# Patient Record
Sex: FEMALE | Race: WHITE | NOT HISPANIC OR LATINO | Employment: OTHER | URBAN - METROPOLITAN AREA
[De-identification: names, ages, dates, MRNs, and addresses within clinical notes are randomized per-mention and may not be internally consistent; named-entity substitution may affect disease eponyms.]

---

## 2017-01-16 ENCOUNTER — ALLSCRIPTS OFFICE VISIT (OUTPATIENT)
Dept: OTHER | Facility: OTHER | Age: 82
End: 2017-01-16

## 2017-09-18 ENCOUNTER — APPOINTMENT (OUTPATIENT)
Dept: LAB | Facility: HOSPITAL | Age: 82
End: 2017-09-18
Payer: MEDICARE

## 2017-09-18 ENCOUNTER — ALLSCRIPTS OFFICE VISIT (OUTPATIENT)
Dept: OTHER | Facility: OTHER | Age: 82
End: 2017-09-18

## 2017-09-18 DIAGNOSIS — Z12.11 ENCOUNTER FOR SCREENING FOR MALIGNANT NEOPLASM OF COLON: ICD-10-CM

## 2017-09-18 DIAGNOSIS — E55.9 VITAMIN D DEFICIENCY: ICD-10-CM

## 2017-09-18 DIAGNOSIS — D50.9 IRON DEFICIENCY ANEMIA: ICD-10-CM

## 2017-09-18 DIAGNOSIS — E03.9 HYPOTHYROIDISM: ICD-10-CM

## 2017-09-18 DIAGNOSIS — E78.00 PURE HYPERCHOLESTEROLEMIA: ICD-10-CM

## 2017-09-18 LAB
25(OH)D3 SERPL-MCNC: 40.6 NG/ML (ref 30–100)
ANION GAP SERPL CALCULATED.3IONS-SCNC: 9 MMOL/L (ref 4–13)
BASOPHILS # BLD AUTO: 0.03 THOUSANDS/ΜL (ref 0–0.1)
BASOPHILS NFR BLD AUTO: 1 % (ref 0–1)
BUN SERPL-MCNC: 13 MG/DL (ref 5–25)
CALCIUM SERPL-MCNC: 9.3 MG/DL (ref 8.3–10.1)
CHLORIDE SERPL-SCNC: 105 MMOL/L (ref 100–108)
CHOLEST SERPL-MCNC: 253 MG/DL (ref 50–200)
CO2 SERPL-SCNC: 28 MMOL/L (ref 21–32)
CREAT SERPL-MCNC: 0.72 MG/DL (ref 0.6–1.3)
EOSINOPHIL # BLD AUTO: 0.16 THOUSAND/ΜL (ref 0–0.61)
EOSINOPHIL NFR BLD AUTO: 3 % (ref 0–6)
ERYTHROCYTE [DISTWIDTH] IN BLOOD BY AUTOMATED COUNT: 17.2 % (ref 11.6–15.1)
GFR SERPL CREATININE-BSD FRML MDRD: 78 ML/MIN/1.73SQ M
GLUCOSE P FAST SERPL-MCNC: 74 MG/DL (ref 65–99)
HCT VFR BLD AUTO: 44.1 % (ref 34.8–46.1)
HDLC SERPL-MCNC: 119 MG/DL (ref 40–60)
HGB BLD-MCNC: 13.9 G/DL (ref 11.5–15.4)
LDLC SERPL CALC-MCNC: 121 MG/DL (ref 0–100)
LYMPHOCYTES # BLD AUTO: 0.9 THOUSANDS/ΜL (ref 0.6–4.47)
LYMPHOCYTES NFR BLD AUTO: 16 % (ref 14–44)
MCH RBC QN AUTO: 29.3 PG (ref 26.8–34.3)
MCHC RBC AUTO-ENTMCNC: 31.5 G/DL (ref 31.4–37.4)
MCV RBC AUTO: 93 FL (ref 82–98)
MONOCYTES # BLD AUTO: 0.57 THOUSAND/ΜL (ref 0.17–1.22)
MONOCYTES NFR BLD AUTO: 10 % (ref 4–12)
NEUTROPHILS # BLD AUTO: 3.93 THOUSANDS/ΜL (ref 1.85–7.62)
NEUTS SEG NFR BLD AUTO: 70 % (ref 43–75)
NRBC BLD AUTO-RTO: 0 /100 WBCS
PLATELET # BLD AUTO: 286 THOUSANDS/UL (ref 149–390)
PMV BLD AUTO: 9.8 FL (ref 8.9–12.7)
POTASSIUM SERPL-SCNC: 4.1 MMOL/L (ref 3.5–5.3)
RBC # BLD AUTO: 4.74 MILLION/UL (ref 3.81–5.12)
SODIUM SERPL-SCNC: 142 MMOL/L (ref 136–145)
TRIGL SERPL-MCNC: 66 MG/DL
TSH SERPL DL<=0.05 MIU/L-ACNC: 3.56 UIU/ML (ref 0.36–3.74)
WBC # BLD AUTO: 5.61 THOUSAND/UL (ref 4.31–10.16)

## 2017-09-18 PROCEDURE — 84443 ASSAY THYROID STIM HORMONE: CPT

## 2017-09-18 PROCEDURE — 36415 COLL VENOUS BLD VENIPUNCTURE: CPT

## 2017-09-18 PROCEDURE — 80061 LIPID PANEL: CPT

## 2017-09-18 PROCEDURE — 82306 VITAMIN D 25 HYDROXY: CPT

## 2017-09-18 PROCEDURE — 85025 COMPLETE CBC W/AUTO DIFF WBC: CPT

## 2017-09-18 PROCEDURE — 80048 BASIC METABOLIC PNL TOTAL CA: CPT

## 2017-09-20 ENCOUNTER — GENERIC CONVERSION - ENCOUNTER (OUTPATIENT)
Dept: OTHER | Facility: OTHER | Age: 82
End: 2017-09-20

## 2017-11-08 ENCOUNTER — GENERIC CONVERSION - ENCOUNTER (OUTPATIENT)
Dept: OTHER | Facility: OTHER | Age: 82
End: 2017-11-08

## 2017-11-13 ENCOUNTER — ALLSCRIPTS OFFICE VISIT (OUTPATIENT)
Dept: OTHER | Facility: OTHER | Age: 82
End: 2017-11-13

## 2017-12-07 RX ORDER — ZOLEDRONIC ACID 5 MG/100ML
5 INJECTION, SOLUTION INTRAVENOUS ONCE
Status: COMPLETED | OUTPATIENT
Start: 2017-12-08 | End: 2017-12-08

## 2017-12-07 RX ORDER — SODIUM CHLORIDE 9 MG/ML
20 INJECTION, SOLUTION INTRAVENOUS CONTINUOUS
Status: DISCONTINUED | OUTPATIENT
Start: 2017-12-08 | End: 2017-12-11 | Stop reason: HOSPADM

## 2017-12-08 ENCOUNTER — HOSPITAL ENCOUNTER (OUTPATIENT)
Dept: INFUSION CENTER | Facility: CLINIC | Age: 82
Discharge: HOME | End: 2017-12-08
Payer: MEDICARE

## 2017-12-08 ENCOUNTER — ALLSCRIPTS OFFICE VISIT (OUTPATIENT)
Dept: OTHER | Facility: OTHER | Age: 82
End: 2017-12-08

## 2017-12-08 VITALS
SYSTOLIC BLOOD PRESSURE: 138 MMHG | HEART RATE: 70 BPM | DIASTOLIC BLOOD PRESSURE: 72 MMHG | TEMPERATURE: 98 F | WEIGHT: 143.3 LBS | HEIGHT: 61 IN | BODY MASS INDEX: 27.06 KG/M2 | RESPIRATION RATE: 18 BRPM

## 2017-12-08 LAB
ALBUMIN SERPL BCP-MCNC: 3.6 G/DL (ref 3.2–5)
ALP SERPL-CCNC: 58 U/L (ref 46–116)
ALT SERPL W P-5'-P-CCNC: 22 U/L (ref 12–78)
ANION GAP SERPL CALCULATED.3IONS-SCNC: 13 MMOL/L (ref 4–13)
AST SERPL W P-5'-P-CCNC: 30 U/L (ref 5–45)
BILIRUB SERPL-MCNC: 1.2 MG/DL (ref 0.2–1)
BUN SERPL-MCNC: 16 MG/DL (ref 5–25)
CALCIUM SERPL-MCNC: 9.6 MG/DL (ref 8.3–10.1)
CHLORIDE SERPL-SCNC: 104 MMOL/L (ref 98–108)
CO2 SERPL-SCNC: 28 MMOL/L (ref 21–32)
CREAT SERPL-MCNC: 1 MG/DL (ref 0.6–1.3)
GFR SERPL CREATININE-BSD FRML MDRD: 52 ML/MIN/1.73SQ M
GLUCOSE SERPL-MCNC: 97 MG/DL (ref 65–140)
POTASSIUM SERPL-SCNC: 4.4 MMOL/L (ref 3.5–5.3)
PROT SERPL-MCNC: 6.8 G/DL (ref 6.4–8.2)
SODIUM SERPL-SCNC: 145 MMOL/L (ref 136–145)

## 2017-12-08 PROCEDURE — 80053 COMPREHEN METABOLIC PANEL: CPT | Performed by: INTERNAL MEDICINE

## 2017-12-08 PROCEDURE — 96365 THER/PROPH/DIAG IV INF INIT: CPT

## 2017-12-08 RX ORDER — MULTIVIT-MIN/IRON/FOLIC ACID/K 18-600-40
4000 CAPSULE ORAL DAILY
COMMUNITY

## 2017-12-08 RX ORDER — LEVOTHYROXINE SODIUM 0.05 MG/1
50 TABLET ORAL DAILY
COMMUNITY
End: 2018-03-21

## 2017-12-08 RX ADMIN — SODIUM CHLORIDE 20 ML/HR: 0.9 INJECTION, SOLUTION INTRAVENOUS at 11:00

## 2017-12-08 RX ADMIN — ZOLEDRONIC ACID 5 MG: 0.05 INJECTION, SOLUTION INTRAVENOUS at 11:02

## 2017-12-11 NOTE — PROGRESS NOTES
Assessment    1  Iron deficiency anemia (280 9) (D50 9)   2  Osteoporosis (733 00) (M81 0)    Plan  Iron deficiency anemia, Osteoporosis    · Follow-up PRN Evaluation and Treatment  Follow-up  Status: Complete  Done:[id number]   Ordered;Iron deficiency anemia, Osteoporosis; Ordered By: Segundo Rainey Performed:  Due: [id number]   · * DXA BONE DENSITY SPINE HIP AND PELVIS; Status:Active; Requested IMB:[id number]:74CK;    Perform:[facility] Radiology; Order Comments:NO CALCIUM 24 HOURS PRIOR TO TEST; Due:12Jan2019; Last Updated By:Jose Mccullough; 12/8/2017 8:50:09 AM;Ordered;deficiency anemia, Osteoporosis; Ordered By:Sharee Alvarado;   · * MAMMO SCREENING BILATERAL W CAD; Status:Active; Requested for:12Jan201809:40AM;    Perform:[facility] Radiology; Order Comments:KOKO Keen; XJD:[id number]; Last Updated By:Jose Mccullough; 12/8/2017 8:50:09 AM;Ordered;deficiency anemia, Osteoporosis; Ordered By:Sharee Alvarado;  Osteoporosis    · (1) COMPREHENSIVE METABOLIC PANEL; Status:Active - Retrospective By ProtocolAuthorization; Requested for:[id number];    Perform:[facility]; Order Comments:STAT; PWF:[id number]; Last Updated By:Justina Vann; 12/8/2017 9:42:50 AM;Ordered; Stat;Ordered By:Sharee Alvarado; Discussion/Summary  Discussion Summary:   Jackie Peñaloza has been feeling well  Anemia has not recurred since she received iron sucrose 200 mg IV weekly x2 back in June 2015  D 1000 units daily is to be continued in regards to history of mild vitamin D deficiency and osteoporosis of the femoral neck  She does not tolerate Caltrate but is able to tolerate Tums, accordingly, she is to take Tums 500 mg daily  is to receive the 5th course of Reclast 5 mg IV today  Afterwards, a DEXA scan is to be obtained and a decision as to further Reclast accordingly  mammogram is recommended for breast cancer screening    patient is aware to seek medical attention for shortness of breath, lightheadedness, easy fatigue fatigue, or if other new problems arise  CBC is to be monitored at least yearly in conjunction with history and physical examination in regards to history of iron deficiency anemia  She plans to follow up with her primary care physician in this regard and at her preference a follow-up visit at our office was not scheduled  However, please do not hesitate to let us know if we can be of further assistance in the care of [facility]  In particular, I would like to see her again in the case of recurrent anemia or other blood count abnormalities or for decisions as to management of bone mineral loss  Chief Complaint  Chief Complaint Free Text Note Form: [facility] was seen in followup today in regards to iron deficiency anemia and osteoporosis with vitamin D deficiency  History of Present Illness  HPI: She has been feeling well  She has been taking vitamin D 1000 units daily and Tums 2-3 times per week and a multiple vitamin daily  She's been taking about a 4 mile walk 3-4 days per week  Previous Therapy:   1  Anemia  There was laboratory evidence of iron deficiency in March 2011 without known blood loss  Anemia resolved with Venofer 1000 mg from March 2012 to April 2012  Recurrent iron deficiency in April 2013  A second course of Venofer 1000 mg from April 2013 to May 2013  Venofer 400 mg in 2 sessions in June 2014  Severe osteopenia of the lumbar spine and osteoporosis of the femoral neck  The first course of Reclast 5 mg IV given on May 24, 2013, 3 courses thus far most recently on December 11, 2015  Vitamin D deficiency, vitamin D 2000 units daily resumed in October 2013  Review of Systems  Complete-Female:  Constitutional: She has been feeling well  ENT: She has been sinus stuffiness associated with chronic allergies  Cardiovascular: no chest pain-- and-- no lower extremity edema  Respiratory: no shortness of breath-- and-- no cough    Gastrointestinal: no abdominal pain,-- no constipation-- and-- no diarrhea  Musculoskeletal: no arthralgias  Neurological: She has occasional frontal headache associated with chronic allergies  , but-- no difficulty walking  Psychiatric: no emotional problems  Active Problems  1  Age-related macular degeneration (362 50) (H35 30)   2  Hiatal hernia (553 3) (K44 9)   3  Hypercholesterolemia (272 0) (E78 00)   4  Hypothyroidism (244 9) (E03 9)   5  Iron deficiency anemia (280 9) (D50 9)   6  Medicare annual wellness visit, subsequent (V70 0) (Z00 00)   7  Mild vitamin D deficiency (268 9) (E55 9)   8  Mixed hyperlipidemia (272 2) (E78 2)   9  Need for influenza vaccination (V04 81) (Z23)   10  Need for pneumococcal vaccine (V03 82) (Z23)   11  Nocturia (788 43) (R35 1)   12  Osteoporosis (733 00) (M81 0)   13  Screening for colon cancer (V76 51) (Z12 11)   14  Screening for genitourinary condition (V81 6) (Z13 89)    Past Medical History    1  History of Distal radius fracture (813 42) (S52 509A)   2  History of Encounter for screening mammogram for malignant neoplasm of breast (V76 12) (Z12 31)   3   2 para 2 (V49 89) (Z78 9)   4  History of esophageal reflux (V12 79) (Z87 19)   5  History of tinnitus (V12 49) (Z86 69)   6  History of urinary incontinence (V13 09) (Z87 898)   7  History of Menopause (627 2) (Z78 0)   8  History of Screening for osteoporosis (V82 81) (B64 225)    Surgical History  1  History of Bunion Correction By Double Osteotomy   2  History of Closed Treatment Of Fracture Of The Distal Radius   3  History of Open Treatment Of Fracture Of Distal Radius   4  History of Thyroid Surgery Total Thyroidectomy   5  History of Tonsillectomy With Adenoidectomy    Family History  Mother    1  Family history of sepsis (V18 8) (Z83 1)  Father    2  Family history of cerebrovascular accident (CVA) (V17 1) (Z82 3)  Paternal Grandmother    3  Family history of cardiovascular disease (V17 49) (Z82 49)   4   Family history of type 2 diabetes mellitus (V18 0) (Z83 3)  Grandfather    5  Family history of cerebrovascular accident (V17 1) (Z82 3)  Paternal Grandfather    10  Family history of cardiovascular disease (V17 49) (Z82 49)    Social History     · Cultural background   ·    · Former smoker (V15 82) (S81 165)   · Lives with daughter   · Occupation   · Primary spoken language English   · Racial background   · Remote social alcohol use   · Retired    Current Meds   1  Synthroid 50 MCG Oral Tablet; Therapy: (Recorded:03Jun2014) to Recorded   2  Vitamin D3 5000 UNIT Oral Tablet; Therapy: (TJZXMGBJ:[id number]) to Recorded    Allergies  1  Iron    Vitals  Vital Signs    Recorded: 29JRI3548 08:18AM   Temperature 97 2 F   Heart Rate 64   Respiration 16   Systolic 991   Diastolic 80   Height 5 ft 2 in   Weight 143 lb    BMI Calculated 26 16   BSA Calculated 1 66   O2 Saturation 97   Pain Scale 0       Physical Exam   Constitutional She appears well  Eyes EOMI  Ears, Nose, Mouth, and Throat Oropharynx is clear  Pulmonary  Auscultation of lungs: Clear to auscultation  Cardiovascular Heart has regular rate and rhythm  -- No lower extremity edema  Abdomen  Abdomen: Non-tender, no masses  Liver and spleen: No hepatomegaly or splenomegaly  Lymphatic  Palpation of lymph nodes in neck: No lymphadenopathy  -- No axillary or inguinal lymphadenopathy  Musculoskeletal  Gait and station: Normal    Neurologic Neurological is grossly intact  Psychiatric  Orientation to person, place, and time: Normal    Mood and affect: Normal    Additional Exam:  (Breast examination has been done at her primary care physician's office in September 2017 and was declined today  )         ECOG 0       Results/Data  (1) COMPREHENSIVE METABOLIC PANEL [id number] 42:73CX Arvil Bal     Test Name Result Flag Reference   GLUCOSE,RANDM 97 mg/dL       If the patient is fasting, the ADA then defines impaired fasting glucose as > 100 mg/dL and diabetes as > or equal to 123 mg/dL  Specimen collection should occur prior to Sulfasalazine administration due to the potential for falsely depressed results  Specimen collection should occur prior to Sulfapyridine administration due to the potential for falsely elevated results  SODIUM 145 mmol/L  136-145   POTASSIUM 4 4 mmol/L  3 5-5 3   CHLORIDE 104 mmol/L     CARBON DIOXIDE 28 mmol/L  21-32   ANION GAP (CALC) 13 mmol/L  4-13   BLOOD UREA NITROGEN 16 mg/dL  5-25   CREATININE 1 00 mg/dL  0 60-1 30   Standardized to IDMS reference method   CALCIUM 9 6 mg/dL  8 3-10 1   BILI, TOTAL 1 20 mg/dL H 0 20-1 00   ALK PHOSPHATAS 58 U/L     ALT (SGPT) 22 U/L  12-78   Specimen collection should occur prior to Sulfasalazine administration due to the potential for falsely depressed results  AST(SGOT) 30 U/L  5-45   Specimen collection should occur prior to Sulfasalazine administration due to the potential for falsely depressed results  ALBUMIN 3 6 g/dL  3 2-5 0   TOTAL PROTEIN 6 8 g/dL  6 4-8 2   eGFR 52 ml/min/1 73sq [facility] Disease Education Program recommendations are as follows: GFR calculation is accurate only with a steady state creatinine Chronic Kidney disease less than 60 ml/min/1 73 sq  meters Kidney failure less than 15 ml/min/1 73 sq  meters  Results Form:   Results  Lab Results From November 22, 2016: serum iron is 37 with saturation 9%  May 24, 2016: 25 hydroxy vitamin D is 71  5  September 18, 2017: WBC is 5 61, hemoglobin 13 9 with MCV 93, platelets 281, 25 hydroxy vitamin-D is 40 6, TSH 3 560  From December 8, 2017: Creatinine 1 0, bili 1 2, AST 30, ALT 22, alk-phos 58  Future Appointments    Date/Time Provider Specialty Site   05/14/2018 09:00 AM VAN Singh   [address] [place] [address]       Signatures   Electronically signed by : VAN Cruz ; Dec 10 2017  2:22PM EST                       (Author)

## 2018-01-10 DIAGNOSIS — Z12.31 ENCOUNTER FOR SCREENING MAMMOGRAM FOR MALIGNANT NEOPLASM OF BREAST: ICD-10-CM

## 2018-01-12 ENCOUNTER — HOSPITAL ENCOUNTER (OUTPATIENT)
Dept: BONE DENSITY | Facility: MEDICAL CENTER | Age: 83
Discharge: HOME | End: 2018-01-12
Payer: MEDICARE

## 2018-01-12 ENCOUNTER — GENERIC CONVERSION - ENCOUNTER (OUTPATIENT)
Dept: OTHER | Facility: OTHER | Age: 83
End: 2018-01-12

## 2018-01-12 ENCOUNTER — HOSPITAL ENCOUNTER (OUTPATIENT)
Dept: MAMMOGRAPHY | Facility: MEDICAL CENTER | Age: 83
Discharge: HOME | End: 2018-01-12
Payer: MEDICARE

## 2018-01-12 DIAGNOSIS — D50.9 IRON DEFICIENCY ANEMIA: ICD-10-CM

## 2018-01-12 DIAGNOSIS — M81.0 AGE-RELATED OSTEOPOROSIS WITHOUT CURRENT PATHOLOGICAL FRACTURE: ICD-10-CM

## 2018-01-12 DIAGNOSIS — Z12.31 SCREENING MAMMOGRAM, ENCOUNTER FOR: ICD-10-CM

## 2018-01-12 PROCEDURE — 77080 DXA BONE DENSITY AXIAL: CPT

## 2018-01-12 PROCEDURE — 77067 SCR MAMMO BI INCL CAD: CPT

## 2018-01-13 VITALS
DIASTOLIC BLOOD PRESSURE: 68 MMHG | BODY MASS INDEX: 23.66 KG/M2 | WEIGHT: 142 LBS | HEIGHT: 65 IN | SYSTOLIC BLOOD PRESSURE: 122 MMHG

## 2018-01-13 VITALS
SYSTOLIC BLOOD PRESSURE: 132 MMHG | BODY MASS INDEX: 23.32 KG/M2 | HEIGHT: 65 IN | TEMPERATURE: 97.6 F | RESPIRATION RATE: 16 BRPM | DIASTOLIC BLOOD PRESSURE: 72 MMHG | WEIGHT: 140 LBS

## 2018-01-15 NOTE — MISCELLANEOUS
Message  Got notification from [facility] the Dexa scan was not done ,patient declined to have testing performed  Active Problems    1  Age-related macular degeneration (362 50) (H35 30)   2  Hiatal hernia (553 3) (K44 9)   3  Hypercholesterolemia (272 0) (E78 00)   4  Hypothyroidism (244 9) (E03 9)   5  Iron deficiency anemia (280 9) (D50 9)   6  Mild vitamin D deficiency (268 9) (E55 9)   7  Mixed hyperlipidemia (272 2) (E78 2)   8  Need for pneumococcal vaccine (V03 82) (Z23)   9  Osteoporosis (733 00) (M81 0)   10  Screening for colon cancer (V76 51) (Z12 11)    Current Meds   1  Synthroid 50 MCG Oral Tablet (Levothyroxine Sodium); Therapy: (Recorded:03Jun2014) to Recorded   2  Vitamin D3 5000 UNIT Oral Tablet; Therapy: (DVXTCLGA:[id number]) to Recorded    Allergies    1   Iron    Signatures   Electronically signed by : Alka Flanagan, ; Nov 8 2017  8:51AM EST                       (Author)

## 2018-01-15 NOTE — RESULT NOTES
Verified Results  (1) CBC/PLT/DIFF [id number] 04:51PM Juaquin Primes     Test Name Result Flag Reference   WBC COUNT 5 61 Thousand/uL  4 31-10 16   RBC COUNT 4 74 Million/uL  3 81-5 12   HEMOGLOBIN 13 9 g/dL  11 5-15 4   HEMATOCRIT 44 1 %  34 8-46  1   MCV 93 fL  82-98   MCH 29 3 pg  26 8-34 3   MCHC 31 5 g/dL  31 4-37 4   RDW 17 2 % H 11 6-15 1   MPV 9 8 fL  8 9-12 7   PLATELET COUNT 381 Thousands/uL  149-390   nRBC AUTOMATED 0 /100 WBCs     NEUTROPHILS RELATIVE PERCENT 70 %  43-75   LYMPHOCYTES RELATIVE PERCENT 16 %  14-44   MONOCYTES RELATIVE PERCENT 10 %  4-12   EOSINOPHILS RELATIVE PERCENT 3 %  0-6   BASOPHILS RELATIVE PERCENT 1 %  0-1   NEUTROPHILS ABSOLUTE COUNT 3 93 Thousands/? ??L  1 85-7 62   LYMPHOCYTES ABSOLUTE COUNT 0 90 Thousands/? ??L  0 60-4 47   MONOCYTES ABSOLUTE COUNT 0 57 Thousand/? ??L  0 17-1 22   EOSINOPHILS ABSOLUTE COUNT 0 16 Thousand/? ??L  0 00-0 61   BASOPHILS ABSOLUTE COUNT 0 03 Thousands/? ??L  0 00-0 10

## 2018-01-16 NOTE — PROGRESS NOTES
Assessment    1  Medicare annual wellness visit, subsequent (V70 0) (Z00 00)   2  Hypercholesterolemia (272 0) (E78 00)   3  Hypothyroidism (244 9) (E03 9)   4  Nocturia (788 43) (R35 1)   5  Need for influenza vaccination (V04 81) (Z23)    Plan  Need for influenza vaccination    · Influenza; INJECT 0 5  ML Intramuscular; To Be Done: [id number]    Discussion/Summary    AWV completed today  Patient did have 1 fall this year when she walked outside and was blinded by the sun, causing her to lose balance on uneven ground  She has since seen PT and feels her balance is much improved  She lives in a ranch style home with her daughter who is a nurse and is fall-proofing the home  We also discussed nocturia resulting in some incontinence and recommended not having coffee and alcohol with dinner to prevent diuresis  Flu vaccine administered today  Follow up in 6 months  Impression: Subsequent Annual Wellness Visit, with preventive exam as well as age and risk appropriate counseling completed  Cardiovascular screening and counseling: the risks and benefits of screening were discussed, screening is current, counseling was given on maintaining a healthy diet, counseling was given on maintaining a healthy weight and counseling was given on ways to improve cholesterol  Diabetes screening and counseling: the risks and benefits of screening were discussed, screening is current and counseling was given on maintaining a healthy diet  Colorectal cancer screening and counseling: screening not indicated  Breast cancer screening and counseling: screening not indicated  Cervical cancer screening and counseling: screening not indicated  Osteoporosis screening and counseling: screening is current and counseling was given on obtaining adequate amounts of calcium and vitamin D on a daily basis  Abdominal aortic aneurysm screening and counseling: screening not indicated     Glaucoma screening and counseling: screening is current  HIV screening and counseling: screening not indicated  Immunizations: the risks and benefits of influenza vaccination were discussed with the patient, influenza vaccine is due today and the lifetime pneumococcal vaccine has been completed  Advance Directive Planning: complete and up to date  Patient Discussion: plan discussed with the patient, follow-up visit needed in 6 months  Self Referrals: No   The treatment plan was reviewed with the patient/guardian  The patient/guardian understands and agrees with the treatment plan      Chief Complaint  79 y/o female presents for AWV      History of Present Illness  HPI: 79 y/o female presents for AWV  She is doing well, keeping very active with walking, playing tennis and playing pickleball  Admits that she sometimes has problems staying asleep due to nocturia that sometimes is very urgent and results in leakage  She now sometimes wears a liner at night  She admits to drinking coffee and a glass of wine with dinner  Welcome to [employer] and Wellness Visits: The patient is being seen for the subsequent annual wellness visit  Medicare Screening and Risk Factors   Hospitalizations: she has been previously hospitalizied, she has been hospitalized 3 times and anemia, thyroidectomy  Once per lifetime medicare screening tests: ECG (NSR) and AAA screening US has not yet been done  Medicare Screening Tests Risk Questions   Abdominal aortic aneurysm risk assessment: none indicated  Osteoporosis risk assessment: , female gender, over 48years of age, alcohol use and 1 glass wine with dinner  HIV risk assessment: none indicated  Drug and Alcohol Use: The patient is a former cigarette smoker  She has smoked for 35 year(s) and has 35 pack year(s) of cigarette use  The patient reports drinking 1 drinks per day  Alcohol concern:   The patient has no concerns about alcohol abuse  She has never used illicit drugs     Diet and Physical Activity: Current diet includes well balanced meals  She exercises daily  Exercise: walking, tennis and pickleball 4 hours per week  Mood Disorder and Cognitive Impairment Screening: She denies feeling down, depressed, or hopeless over the past two weeks  She denies feeling little interest or pleasure in doing things over the past two weeks  Cognitive impairment screening: denies difficulty learning/retaining new information, denies difficulty handling complex tasks, denies difficulty with reasoning, denies difficulty with spatial ability and orientation, denies difficulty with language and denies difficulty with behavior  Functional Ability/Level of Safety: Hearing is hearing aids  She reports hearing difficulties  She uses a hearing aid  The patient is currently able to do activities of daily living without limitations  Activities of daily living details: does not need help using the phone, no transportation help needed, does not need help shopping, no meal preparation help needed, does not need help doing housework, does not need help doing laundry, does not need help managing medications and does not need help managing money  Fall risk factors: The patient fell 1 times in the past 12 months  improved after PT  Injury History: no polypharmacy, no alcohol use, no mobility impairment, no antidepressant use, no deconditioning, no postural hypotension, no sedative use, no visual impairment, no urinary incontinence, no antihypertensive use, no cognitive impairment, up and go test was normal and previous fall The most recent fall occurred 20 week(s) ago outdoors  The fall resulted from tripping  The fall was preceded by blinded by the sun  The fall resulted in abrasion(s) to knees and face and laceration(s) of lip  Home safety risk factors:  no unfamiliar surroundings, no loose rugs, no poor household lighting, no uneven floors, no household clutter, grab bars in the bathroom and handrails on the stairs     Advance Directives: Advance directives: living will, durable power of  for health care directives and advance directives  end of life decisions were reviewed with the patient and I agree with the patient's decisions  Co-Managers and Medical Equipment/Suppliers: See Patient Care Team   Reviewed Updated [address]:   Last Medicare Wellness Visit Information was reviewed, patient interviewed and updates made to the record today  Preventive Quality Program 65 and Older: Falls Risk: The patient fell 1 times in the past 12 months  The most recent fall occurred 20 week(s) ago outdoors  The fall resulted from tripping and blinded by sun  The fall was preceded by a loss of balance and blinded by sun  The fall resulted in abrasion(s) to face and knees and laceration(s) of lip  The patient is currently asymptomatic Symptoms Include:  Associated symptoms:  No associated symptoms are reported  Current treatment includes physical therapy and gait and balance training  By report, there is good compliance with treatment, good tolerance of treatment and good symptom control  Pertinent medical history includes:  visual impairment, hearing impairment, osteoporosis and macular degeneration managed by opthalmology  Hearing aids  Vit D for osteoporosis, but no transient ischemic attacks, no stroke, no dementia, no cardiac arrhythmia, no vertigo, no syncope, no pacemaker, no Parkinson's disease, no gait disturbance, no normal pressure hydrocephalus, no peripheral neuropathy and no recent lower extremity injury  Currently, the patient lives at home with family  Risk factors include:  no stairs at home and no poor lighting  No risk factors have been identified  She was previously evaluated by a primary care provider 5 month(s) ago  Previous presentation included a recent fall  Past evaluation has included complete blood count, electrolytes, bone density studies and ECG   Past treatment has included physical therapy and gait and balance training  The patient is currently experiencing urinary symptoms  Urinary Incontinence Symptoms includes: urinary incontinence, urinary urgency and nocturia, but no incomplete bladder emptying, no urinary frequency, no urinary hesitancy, no dysuria, no straining, no weak stream, no intermittent stream, no post-void dribbling, no vaginal pressure and no vaginal dryness    occasionally has nocturia with urgency that sometimes results in accidents  Patient admits to drinking caffeinated coffee with dinner  Leakage amount is small, resulting in damp underwear  She uses pads infrequently  The leakage occurs 1 times per week  Onset was gradual 6 month(s) ago  There is no known event that preceded symptom onset  Impact on quality of life is described as mild  Exacerbating factors:  caffeine  No relieving factors are noted  Date of last glaucoma screen was 10/2016      Patient Care Team    Care Team Member Role Specialty Office Number   [facility] Specialist Hematology Oncology [phone]     Review of Systems    Constitutional: negative  Head and Face: negative  Eyes: as noted in HPI    ENT: hearing loss, but as noted in HPI  Cardiovascular: negative  Respiratory: negative  Gastrointestinal: negative  Genitourinary: urinary urgency, but as noted in HPI, no dysuria, no urinary frequency, no flank pain, no suprapubic pain and no pelvic pain  Musculoskeletal: negative  Integumentary and Breasts: negative  Neurological: negative  Psychiatric: negative  Endocrine: negative  Hematologic and Lymphatic: negative  Yes, the patient is satisfied with Her life  No, the patient has not dropped any activities or interests  No, the patient does not feel that their life is empty  No, the patient does not often get bored  Yes, the patient is hopeful about the future  No, the patient is not bothered by thoughts in their head  Yes, the patient is in good spirits most of the time     No, the patient is not afraid something bad is going to happen to them  Yes, the patient feels happy most of the time  No, the patient does not often feel helpless  No, the patient medrano not often get restless and fidgety  No, the patient likes to go out and try new things  No, the patient does not worry about the future  No, the patient does not feel that they have more problems with their memory than most    Yes, the patient thinks its wonderful to be alive now  No, the patient does not feel downhearted or blue  No, the patient does not feel worthless the way they are currently  Yes, the patient finds that life is very exciting  No, the patient does not worry a lot about the past    No, it is not hard for the patient to get started on new projects  Yes, the patient feels full of energy  No, the patient does not feel their situation is hopeless  No, the patient does not feel that most people are better off then they are  No, the patient does not frequently get upset about the little things  No, the patient does not frequently feel like crying  No, the patient does not have any problems concentrating  Yes, the patient enjoys getting up in the morning  No, the patient enjoys social gatherings  Yes, the patient finds it easy to make decisions  No, the patient does not feel thier mind is clear as it used to be  Point = 1  Score 1   Normal 0 - 9, Mild Depression 10 - 19, Severe Depression 20 - 30     Over the past 2 weeks, how often have you been bothered by the following problems? 1 ) Little interest or pleasure in doing things? Not at all    2 ) Feeling down, depressed or hopeless? Not at all    3 ) Trouble falling asleep or sleeping too much? Not at all    4 ) Feeling tired or having little energy? Not at all    5 ) Poor appetite or overeating? Not at all    6 ) Feeling bad about yourself, or that you are a failure, or have let yourself or your family down?  Not at all    7 ) Trouble concentrating on things, such as reading a newspaper or watching television? Not at all    8 ) Moving or speaking so slowly that other people could have noticed, or the opposite, moving or speaking faster than usual? Not at all  How difficult have these problems made it for you to do your work, take care of things at home, or get along with people? Not at all  Score 0      Active Problems    1  Age-related macular degeneration (362 50) (H35 30)   2  Hiatal hernia (553 3) (K44 9)   3  Hypercholesterolemia (272 0) (E78 00)   4  Hypothyroidism (244 9) (E03 9)   5  Iron deficiency anemia (280 9) (D50 9)   6  Mild vitamin D deficiency (268 9) (E55 9)   7  Mixed hyperlipidemia (272 2) (E78 2)   8  Need for pneumococcal vaccine (V03 82) (Z23)   9  Osteoporosis (733 00) (M81 0)   10  Screening for colon cancer (V76 51) (Z12 11)    Past Medical History    · History of Distal radius fracture (813 42) (S52 509A)   · History of Encounter for screening mammogram for malignant neoplasm of breast  (V76 12) (Z12 31)   ·  2 para 2 (V49 89) (Z78 9)   · History of esophageal reflux (V12 79) (Z87 19)   · History of tinnitus (V12 49) (Z86 69)   · History of urinary incontinence (V13 09) (B73 183)   · History of Menopause (627 2) (Z78 0)   · History of Screening for osteoporosis (V82 81) (M80 900)    The active problems and past medical history were reviewed and updated today  Surgical History    · History of Bunion Correction By Double Osteotomy   · History of Closed Treatment Of Fracture Of The Distal Radius   · History of Open Treatment Of Fracture Of Distal Radius   · History of Thyroid Surgery Total Thyroidectomy   · History of Tonsillectomy With Adenoidectomy    The surgical history was reviewed and updated today         Family History  Mother    · Family history of sepsis (V18 8) (Z83 1)  Father    · Family history of cerebrovascular accident (CVA) (V17 1) (Z82 3)  Paternal Grandmother    · Family history of cardiovascular disease (V17 49) (Z82 49)   · Family history of type 2 diabetes mellitus (V18 0) (Z83 3)  Grandfather    · Family history of cerebrovascular accident (V17 1) (Z82 3)  Paternal Grandfather    · Family history of cardiovascular disease (V17 49) (Z82 49)    The family history was reviewed and updated today  Social History    · Cultural background   · NON-   ·    · Former smoker (N13 81) (G04 594)   · 1985   · Lives with daughter   · Occupation   · NURSE   · Primary spoken language English   · Racial background   · WHITE   · Remote social alcohol use   · Retired  The social history was reviewed and updated today  The social history was reviewed and is unchanged  Current Meds   1  Synthroid 50 MCG Oral Tablet; Therapy: (Recorded:03Jun2014) to Recorded   2  Vitamin D3 5000 UNIT Oral Tablet; Therapy: (Recorded:03Jun2014) to Recorded    The medication list was reviewed and updated today  Allergies    1  Iron    Immunizations   1    Influenza  22-Nov-2016    PCV  18-Sep-2017     Vitals  Signs    Systolic: 723, RUE, Sitting  Diastolic: 72, RUE, Sitting  Height: 5 ft 2 in  Weight: 142 lb   BMI Calculated: 25 97  BSA Calculated: 1 65    Future Appointments    Date/Time Provider Specialty Site   12/08/2017 08:00 AM VAN Alvarez  Hematology Oncology [facility]   05/14/2018 09:00 AM VAN Skinner   [address] [facility]     Signatures   Electronically signed by : Li Parker [facility]; Nov 13 2017 10:20AM EST                       (Author)    Electronically signed by : VAN Garzon ; Nov 13 2017  2:14PM EST                       (Author)

## 2018-01-22 VITALS
BODY MASS INDEX: 26.13 KG/M2 | WEIGHT: 142 LBS | DIASTOLIC BLOOD PRESSURE: 72 MMHG | SYSTOLIC BLOOD PRESSURE: 118 MMHG | HEIGHT: 62 IN

## 2018-01-22 VITALS
WEIGHT: 143 LBS | BODY MASS INDEX: 26.31 KG/M2 | SYSTOLIC BLOOD PRESSURE: 122 MMHG | HEIGHT: 62 IN | OXYGEN SATURATION: 97 % | DIASTOLIC BLOOD PRESSURE: 80 MMHG | HEART RATE: 64 BPM | RESPIRATION RATE: 16 BRPM | TEMPERATURE: 97.2 F

## 2018-01-31 ENCOUNTER — TELEPHONE (OUTPATIENT)
Dept: HEMATOLOGY ONCOLOGY | Facility: CLINIC | Age: 83
End: 2018-01-31

## 2018-01-31 NOTE — TELEPHONE ENCOUNTER
Called pt  Per Dr Alecia Hennessy review of [address] 1 12 18 and mammogram 1 12 18  Reviewed results per Dr Alecia Hennessy notes-Lspine-mild osteopenia/Femoral neck-hip -severe osteopenia -both sites have improved from March 2015  Follow up Dexa due in Jan 2020-please follow up w/PCP for ordering as discussed at last visit w/Dr Jackie Jonas   Mammo-No evidence of breast Cancer  Pt  Verbalizes understanding above informaion  Extends a thank you to Dr Alecia Hennessy for all his care and the call

## 2018-03-21 DIAGNOSIS — E03.9 HYPOTHYROIDISM, UNSPECIFIED TYPE: Primary | ICD-10-CM

## 2018-03-21 RX ORDER — LEVOTHYROXINE SODIUM 0.05 MG/1
50 TABLET ORAL DAILY
COMMUNITY
End: 2018-03-21 | Stop reason: SDUPTHER

## 2018-03-21 RX ORDER — LEVOTHYROXINE SODIUM 50 MCG
50 TABLET ORAL DAILY
Qty: 30 TABLET | Refills: 2 | Status: SHIPPED | OUTPATIENT
Start: 2018-03-21

## 2018-05-01 ENCOUNTER — TELEPHONE (OUTPATIENT)
Dept: FAMILY MEDICINE CLINIC | Facility: CLINIC | Age: 83
End: 2018-05-01

## 2018-05-03 ENCOUNTER — TELEPHONE (OUTPATIENT)
Dept: FAMILY MEDICINE CLINIC | Facility: CLINIC | Age: 83
End: 2018-05-03

## 2018-05-03 NOTE — TELEPHONE ENCOUNTER
Second attempt made to contact patient regarding her ER visit to start TCM encounter  Left message  [place]
